# Patient Record
Sex: MALE
[De-identification: names, ages, dates, MRNs, and addresses within clinical notes are randomized per-mention and may not be internally consistent; named-entity substitution may affect disease eponyms.]

---

## 2021-01-07 ENCOUNTER — HOSPITAL ENCOUNTER (OUTPATIENT)
Dept: HOSPITAL 92 - BICULT | Age: 81
Discharge: HOME | End: 2021-01-07
Attending: INTERNAL MEDICINE
Payer: MEDICARE

## 2021-01-07 DIAGNOSIS — N18.4: Primary | ICD-10-CM

## 2021-01-07 PROCEDURE — 76770 US EXAM ABDO BACK WALL COMP: CPT

## 2021-01-07 NOTE — ULT
EXAM:

US Renal Bilateral STANDARD



PROVIDED CLINICAL HISTORY:

Chronic kidney disease



COMPARISON:

None



FINDINGS:

Right kidney measures approximately 10.5 x 7.5 x 6.6 cm and demonstrates no evidence for hydronephros
is, sonographically apparent calculus or solid mass. Simple appearing cysts are seen.



Left kidney measures approximately 9.1 x 5.3 x 5.3 cm and demonstrates no evidence for hydronephrosis
, sonographically apparent calculus or solid mass. Simple appearing cysts are seen.



Urinary bladder appears sonographically unremarkable.



IMPRESSION:

No evidence for hydronephrosis.



Reported By: Delfino Garsia 

Electronically Signed:  1/7/2021 2:52 PM

## 2021-04-09 ENCOUNTER — HOSPITAL ENCOUNTER (EMERGENCY)
Dept: HOSPITAL 92 - CSHERS | Age: 81
Discharge: HOME | End: 2021-04-09
Payer: MEDICARE

## 2021-04-09 DIAGNOSIS — Z79.82: ICD-10-CM

## 2021-04-09 DIAGNOSIS — I10: ICD-10-CM

## 2021-04-09 DIAGNOSIS — Z79.84: ICD-10-CM

## 2021-04-09 DIAGNOSIS — E11.51: Primary | ICD-10-CM

## 2021-04-09 DIAGNOSIS — Z79.899: ICD-10-CM

## 2021-04-09 DIAGNOSIS — I70.213: ICD-10-CM

## 2021-04-09 PROCEDURE — 93970 EXTREMITY STUDY: CPT

## 2021-05-01 ENCOUNTER — HOSPITAL ENCOUNTER (OUTPATIENT)
Dept: HOSPITAL 92 - CSHERS | Age: 81
Setting detail: OBSERVATION
LOS: 1 days | Discharge: HOME | End: 2021-05-02
Attending: INTERNAL MEDICINE | Admitting: FAMILY MEDICINE
Payer: MEDICARE

## 2021-05-01 VITALS — BODY MASS INDEX: 36.6 KG/M2

## 2021-05-01 DIAGNOSIS — Z95.810: ICD-10-CM

## 2021-05-01 DIAGNOSIS — D63.1: ICD-10-CM

## 2021-05-01 DIAGNOSIS — R79.1: ICD-10-CM

## 2021-05-01 DIAGNOSIS — I12.9: ICD-10-CM

## 2021-05-01 DIAGNOSIS — Z20.822: ICD-10-CM

## 2021-05-01 DIAGNOSIS — Z79.01: ICD-10-CM

## 2021-05-01 DIAGNOSIS — Z79.899: ICD-10-CM

## 2021-05-01 DIAGNOSIS — N18.4: ICD-10-CM

## 2021-05-01 DIAGNOSIS — I25.10: ICD-10-CM

## 2021-05-01 DIAGNOSIS — M10.9: ICD-10-CM

## 2021-05-01 DIAGNOSIS — I42.9: Primary | ICD-10-CM

## 2021-05-01 DIAGNOSIS — N39.0: ICD-10-CM

## 2021-05-01 DIAGNOSIS — E11.22: ICD-10-CM

## 2021-05-01 DIAGNOSIS — Z86.73: ICD-10-CM

## 2021-05-01 DIAGNOSIS — Z79.82: ICD-10-CM

## 2021-05-01 DIAGNOSIS — I48.20: ICD-10-CM

## 2021-05-01 LAB
ALBUMIN SERPL BCG-MCNC: 3.3 G/DL (ref 3.4–4.8)
ALP SERPL-CCNC: 121 U/L (ref 40–110)
ALT SERPL W P-5'-P-CCNC: 24 U/L (ref 8–55)
ANION GAP SERPL CALC-SCNC: 15 MMOL/L (ref 10–20)
APTT PPP: 46.2 SEC (ref 22–33)
APTT PPP: 48.6 SEC (ref 22–33)
AST SERPL-CCNC: 23 U/L (ref 5–34)
BASOPHILS # BLD AUTO: 0.1 10X3/UL (ref 0–0.2)
BASOPHILS NFR BLD AUTO: 0.9 % (ref 0–2)
BILIRUB SERPL-MCNC: 0.3 MG/DL (ref 0.2–1.2)
BUN SERPL-MCNC: 51 MG/DL (ref 8.4–25.7)
CALCIUM SERPL-MCNC: 8.7 MG/DL (ref 7.8–10.44)
CHLORIDE SERPL-SCNC: 106 MMOL/L (ref 98–107)
CO2 SERPL-SCNC: 25 MMOL/L (ref 23–31)
CREAT CL PREDICTED SERPL C-G-VRATE: 0 ML/MIN (ref 70–130)
EOSINOPHIL # BLD AUTO: 0.4 10X3/UL (ref 0–0.5)
EOSINOPHIL NFR BLD AUTO: 4.9 % (ref 0–6)
GLOBULIN SER CALC-MCNC: 3.6 G/DL (ref 2.4–3.5)
GLUCOSE SERPL-MCNC: 121 MG/DL (ref 83–110)
HGB BLD-MCNC: 9.7 G/DL (ref 13.5–17.5)
INR PPP: 5.8
INR PPP: 6
LEUKOCYTE ESTERASE UR QL STRIP.AUTO: 500
LYMPHOCYTES NFR BLD AUTO: 18 % (ref 18–47)
MCH RBC QN AUTO: 32.8 PG (ref 27–33)
MCV RBC AUTO: 102.7 FL (ref 81.2–95.1)
MONOCYTES # BLD AUTO: 0.9 10X3/UL (ref 0–1.1)
MONOCYTES NFR BLD AUTO: 11.4 % (ref 0–10)
NEUTROPHILS # BLD AUTO: 5.1 10X3/UL (ref 1.5–8.4)
NEUTROPHILS NFR BLD AUTO: 63.8 % (ref 40–75)
PLATELET # BLD AUTO: 214 10X3/UL (ref 150–450)
POTASSIUM SERPL-SCNC: 4.6 MMOL/L (ref 3.5–5.1)
PROT UR STRIP.AUTO-MCNC: 30 MG/DL
PROTHROMBIN TIME: 59 SEC (ref 9.5–12.1)
PROTHROMBIN TIME: 61.1 SEC (ref 9.5–12.1)
RBC # BLD AUTO: 2.96 10X6/UL (ref 4.32–5.72)
SODIUM SERPL-SCNC: 141 MMOL/L (ref 136–145)
SP GR UR STRIP: 1.01 (ref 1–1.04)
WBC # BLD AUTO: 8 10X3/UL (ref 3.5–10.5)
WBC UR QL AUTO: (no result) HPF (ref 0–3)

## 2021-05-01 PROCEDURE — 93010 ELECTROCARDIOGRAM REPORT: CPT

## 2021-05-01 PROCEDURE — 93306 TTE W/DOPPLER COMPLETE: CPT

## 2021-05-01 PROCEDURE — U0003 INFECTIOUS AGENT DETECTION BY NUCLEIC ACID (DNA OR RNA); SEVERE ACUTE RESPIRATORY SYNDROME CORONAVIRUS 2 (SARS-COV-2) (CORONAVIRUS DISEASE [COVID-19]), AMPLIFIED PROBE TECHNIQUE, MAKING USE OF HIGH THROUGHPUT TECHNOLOGIES AS DESCRIBED BY CMS-2020-01-R: HCPCS

## 2021-05-01 PROCEDURE — 85610 PROTHROMBIN TIME: CPT

## 2021-05-01 PROCEDURE — 81015 MICROSCOPIC EXAM OF URINE: CPT

## 2021-05-01 PROCEDURE — U0005 INFEC AGEN DETEC AMPLI PROBE: HCPCS

## 2021-05-01 PROCEDURE — 74176 CT ABD & PELVIS W/O CONTRAST: CPT

## 2021-05-01 PROCEDURE — 93005 ELECTROCARDIOGRAM TRACING: CPT

## 2021-05-01 PROCEDURE — 36415 COLL VENOUS BLD VENIPUNCTURE: CPT

## 2021-05-01 PROCEDURE — 84484 ASSAY OF TROPONIN QUANT: CPT

## 2021-05-01 PROCEDURE — 85730 THROMBOPLASTIN TIME PARTIAL: CPT

## 2021-05-01 PROCEDURE — 82962 GLUCOSE BLOOD TEST: CPT

## 2021-05-01 PROCEDURE — 96365 THER/PROPH/DIAG IV INF INIT: CPT

## 2021-05-01 PROCEDURE — 99285 EMERGENCY DEPT VISIT HI MDM: CPT

## 2021-05-01 PROCEDURE — 85025 COMPLETE CBC W/AUTO DIFF WBC: CPT

## 2021-05-01 PROCEDURE — G0378 HOSPITAL OBSERVATION PER HR: HCPCS

## 2021-05-01 PROCEDURE — 81003 URINALYSIS AUTO W/O SCOPE: CPT

## 2021-05-01 PROCEDURE — 36416 COLLJ CAPILLARY BLOOD SPEC: CPT

## 2021-05-01 PROCEDURE — 80053 COMPREHEN METABOLIC PANEL: CPT

## 2021-05-01 PROCEDURE — 87086 URINE CULTURE/COLONY COUNT: CPT

## 2021-05-01 PROCEDURE — 87635 SARS-COV-2 COVID-19 AMP PRB: CPT

## 2021-05-01 PROCEDURE — 84550 ASSAY OF BLOOD/URIC ACID: CPT

## 2021-05-02 VITALS — DIASTOLIC BLOOD PRESSURE: 61 MMHG | SYSTOLIC BLOOD PRESSURE: 122 MMHG | TEMPERATURE: 96.9 F

## 2021-05-02 LAB
APTT PPP: 35.4 SEC (ref 22–33)
BASOPHILS # BLD AUTO: 0 10X3/UL (ref 0–0.2)
BASOPHILS NFR BLD AUTO: 0.5 % (ref 0–2)
EOSINOPHIL # BLD AUTO: 0.5 10X3/UL (ref 0–0.5)
EOSINOPHIL NFR BLD AUTO: 5.5 % (ref 0–6)
HGB BLD-MCNC: 9.7 G/DL (ref 13.5–17.5)
INR PPP: 2.5
LYMPHOCYTES NFR BLD AUTO: 21.1 % (ref 18–47)
MCH RBC QN AUTO: 33.3 PG (ref 27–33)
MCV RBC AUTO: 104.1 FL (ref 81.2–95.1)
MONOCYTES # BLD AUTO: 0.9 10X3/UL (ref 0–1.1)
MONOCYTES NFR BLD AUTO: 11.5 % (ref 0–10)
NEUTROPHILS # BLD AUTO: 5 10X3/UL (ref 1.5–8.4)
NEUTROPHILS NFR BLD AUTO: 60.7 % (ref 40–75)
PLATELET # BLD AUTO: 205 10X3/UL (ref 150–450)
PROTHROMBIN TIME: 26.2 SEC (ref 9.5–12.1)
RBC # BLD AUTO: 2.91 10X6/UL (ref 4.32–5.72)
WBC # BLD AUTO: 8.2 10X3/UL (ref 3.5–10.5)

## 2021-08-18 ENCOUNTER — HOSPITAL ENCOUNTER (OUTPATIENT)
Dept: HOSPITAL 92 - CSHERS | Age: 81
Setting detail: OBSERVATION
LOS: 1 days | Discharge: HOME | End: 2021-08-19
Attending: HOSPITALIST | Admitting: INTERNAL MEDICINE
Payer: MEDICARE

## 2021-08-18 VITALS — BODY MASS INDEX: 37.8 KG/M2

## 2021-08-18 DIAGNOSIS — M10.9: ICD-10-CM

## 2021-08-18 DIAGNOSIS — Z79.01: ICD-10-CM

## 2021-08-18 DIAGNOSIS — G93.41: ICD-10-CM

## 2021-08-18 DIAGNOSIS — N18.4: ICD-10-CM

## 2021-08-18 DIAGNOSIS — R79.1: Primary | ICD-10-CM

## 2021-08-18 DIAGNOSIS — Z79.899: ICD-10-CM

## 2021-08-18 DIAGNOSIS — I42.9: ICD-10-CM

## 2021-08-18 DIAGNOSIS — Z20.822: ICD-10-CM

## 2021-08-18 DIAGNOSIS — Z79.82: ICD-10-CM

## 2021-08-18 DIAGNOSIS — E16.2: ICD-10-CM

## 2021-08-18 DIAGNOSIS — E66.9: ICD-10-CM

## 2021-08-18 DIAGNOSIS — N17.9: ICD-10-CM

## 2021-08-18 DIAGNOSIS — I12.9: ICD-10-CM

## 2021-08-18 DIAGNOSIS — E11.9: ICD-10-CM

## 2021-08-18 LAB
ALBUMIN SERPL BCG-MCNC: 3.9 G/DL (ref 3.4–4.8)
ALP SERPL-CCNC: 107 U/L (ref 40–110)
ALT SERPL W P-5'-P-CCNC: 27 U/L (ref 8–55)
ANION GAP SERPL CALC-SCNC: 13 MMOL/L (ref 10–20)
ANION GAP SERPL CALC-SCNC: 17 MMOL/L (ref 10–20)
AST SERPL-CCNC: 33 U/L (ref 5–34)
BASOPHILS # BLD AUTO: 0.1 10X3/UL (ref 0–0.2)
BASOPHILS NFR BLD AUTO: 0.6 % (ref 0–2)
BILIRUB SERPL-MCNC: 0.4 MG/DL (ref 0.2–1.2)
BUN SERPL-MCNC: 41 MG/DL (ref 8.4–25.7)
BUN SERPL-MCNC: 44 MG/DL (ref 8.4–25.7)
CALCIUM SERPL-MCNC: 9.2 MG/DL (ref 7.8–10.44)
CALCIUM SERPL-MCNC: 9.4 MG/DL (ref 7.8–10.44)
CHLORIDE SERPL-SCNC: 108 MMOL/L (ref 98–107)
CHLORIDE SERPL-SCNC: 109 MMOL/L (ref 98–107)
CK MB SERPL-MCNC: 2.4 NG/ML (ref 0–6.6)
CO2 SERPL-SCNC: 22 MMOL/L (ref 23–31)
CO2 SERPL-SCNC: 25 MMOL/L (ref 23–31)
CREAT CL PREDICTED SERPL C-G-VRATE: 0 ML/MIN (ref 70–130)
CREAT CL PREDICTED SERPL C-G-VRATE: 0 ML/MIN (ref 70–130)
EOSINOPHIL # BLD AUTO: 0.4 10X3/UL (ref 0–0.5)
EOSINOPHIL NFR BLD AUTO: 3.5 % (ref 0–6)
GLOBULIN SER CALC-MCNC: 3.3 G/DL (ref 2.4–3.5)
GLUCOSE SERPL-MCNC: 29 MG/DL (ref 83–110)
GLUCOSE SERPL-MCNC: 53 MG/DL (ref 83–110)
HGB BLD-MCNC: 11.8 G/DL (ref 13.5–17.5)
INR PPP: 5.3
LYMPHOCYTES NFR BLD AUTO: 11.3 % (ref 18–47)
MAGNESIUM SERPL-MCNC: 2.2 MG/DL (ref 1.6–2.6)
MCH RBC QN AUTO: 32.8 PG (ref 27–33)
MCV RBC AUTO: 99.7 FL (ref 81.2–95.1)
MONOCYTES # BLD AUTO: 1 10X3/UL (ref 0–1.1)
MONOCYTES NFR BLD AUTO: 8.1 % (ref 0–10)
NEUTROPHILS # BLD AUTO: 9.4 10X3/UL (ref 1.5–8.4)
NEUTROPHILS NFR BLD AUTO: 76 % (ref 40–75)
PLATELET # BLD AUTO: 200 10X3/UL (ref 150–450)
POTASSIUM SERPL-SCNC: 4.4 MMOL/L (ref 3.5–5.1)
POTASSIUM SERPL-SCNC: 5.4 MMOL/L (ref 3.5–5.1)
PROTHROMBIN TIME: 54.7 SEC (ref 9.5–12.1)
RBC # BLD AUTO: 3.6 10X6/UL (ref 4.32–5.72)
SODIUM SERPL-SCNC: 142 MMOL/L (ref 136–145)
SODIUM SERPL-SCNC: 143 MMOL/L (ref 136–145)
WBC # BLD AUTO: 12.4 10X3/UL (ref 3.5–10.5)

## 2021-08-18 PROCEDURE — 83880 ASSAY OF NATRIURETIC PEPTIDE: CPT

## 2021-08-18 PROCEDURE — 82553 CREATINE MB FRACTION: CPT

## 2021-08-18 PROCEDURE — 80053 COMPREHEN METABOLIC PANEL: CPT

## 2021-08-18 PROCEDURE — 93005 ELECTROCARDIOGRAM TRACING: CPT

## 2021-08-18 PROCEDURE — G0378 HOSPITAL OBSERVATION PER HR: HCPCS

## 2021-08-18 PROCEDURE — 82962 GLUCOSE BLOOD TEST: CPT

## 2021-08-18 PROCEDURE — 85025 COMPLETE CBC W/AUTO DIFF WBC: CPT

## 2021-08-18 PROCEDURE — U0002 COVID-19 LAB TEST NON-CDC: HCPCS

## 2021-08-18 PROCEDURE — 96374 THER/PROPH/DIAG INJ IV PUSH: CPT

## 2021-08-18 PROCEDURE — 36415 COLL VENOUS BLD VENIPUNCTURE: CPT

## 2021-08-18 PROCEDURE — 85610 PROTHROMBIN TIME: CPT

## 2021-08-18 PROCEDURE — 36416 COLLJ CAPILLARY BLOOD SPEC: CPT

## 2021-08-18 PROCEDURE — 84484 ASSAY OF TROPONIN QUANT: CPT

## 2021-08-18 PROCEDURE — 83735 ASSAY OF MAGNESIUM: CPT

## 2021-08-18 PROCEDURE — 80048 BASIC METABOLIC PNL TOTAL CA: CPT

## 2021-08-18 PROCEDURE — 83036 HEMOGLOBIN GLYCOSYLATED A1C: CPT

## 2021-08-18 PROCEDURE — 71045 X-RAY EXAM CHEST 1 VIEW: CPT

## 2021-08-19 LAB
ALBUMIN SERPL BCG-MCNC: 3.4 G/DL (ref 3.4–4.8)
ALP SERPL-CCNC: 90 U/L (ref 40–110)
ALT SERPL W P-5'-P-CCNC: 19 U/L (ref 8–55)
ANION GAP SERPL CALC-SCNC: 13 MMOL/L (ref 10–20)
AST SERPL-CCNC: 22 U/L (ref 5–34)
BASOPHILS # BLD AUTO: 0.1 10X3/UL (ref 0–0.2)
BASOPHILS NFR BLD AUTO: 0.6 % (ref 0–2)
BILIRUB SERPL-MCNC: 0.4 MG/DL (ref 0.2–1.2)
BUN SERPL-MCNC: 38 MG/DL (ref 8.4–25.7)
CALCIUM SERPL-MCNC: 9.1 MG/DL (ref 7.8–10.44)
CHLORIDE SERPL-SCNC: 108 MMOL/L (ref 98–107)
CO2 SERPL-SCNC: 25 MMOL/L (ref 23–31)
CREAT CL PREDICTED SERPL C-G-VRATE: 26 ML/MIN (ref 70–130)
EOSINOPHIL # BLD AUTO: 0.4 10X3/UL (ref 0–0.5)
EOSINOPHIL NFR BLD AUTO: 4.5 % (ref 0–6)
GLOBULIN SER CALC-MCNC: 3.1 G/DL (ref 2.4–3.5)
GLUCOSE SERPL-MCNC: 133 MG/DL (ref 83–110)
HGB BLD-MCNC: 10.8 G/DL (ref 13.5–17.5)
INR PPP: 4.6
LYMPHOCYTES NFR BLD AUTO: 19.8 % (ref 18–47)
MAGNESIUM SERPL-MCNC: 2.1 MG/DL (ref 1.6–2.6)
MCH RBC QN AUTO: 33 PG (ref 27–33)
MCV RBC AUTO: 100.3 FL (ref 81.2–95.1)
MONOCYTES # BLD AUTO: 1.2 10X3/UL (ref 0–1.1)
MONOCYTES NFR BLD AUTO: 12.4 % (ref 0–10)
NEUTROPHILS # BLD AUTO: 6.1 10X3/UL (ref 1.5–8.4)
NEUTROPHILS NFR BLD AUTO: 62.1 % (ref 40–75)
PLATELET # BLD AUTO: 163 10X3/UL (ref 150–450)
POTASSIUM SERPL-SCNC: 4.5 MMOL/L (ref 3.5–5.1)
PROTHROMBIN TIME: 47.7 SEC (ref 9.5–12.1)
RBC # BLD AUTO: 3.27 10X6/UL (ref 4.32–5.72)
SODIUM SERPL-SCNC: 141 MMOL/L (ref 136–145)
WBC # BLD AUTO: 9.8 10X3/UL (ref 3.5–10.5)